# Patient Record
Sex: MALE | Race: OTHER | NOT HISPANIC OR LATINO
[De-identification: names, ages, dates, MRNs, and addresses within clinical notes are randomized per-mention and may not be internally consistent; named-entity substitution may affect disease eponyms.]

---

## 2023-04-15 ENCOUNTER — NON-APPOINTMENT (OUTPATIENT)
Age: 28
End: 2023-04-15

## 2023-04-18 ENCOUNTER — APPOINTMENT (OUTPATIENT)
Dept: ORTHOPEDIC SURGERY | Facility: CLINIC | Age: 28
End: 2023-04-18
Payer: MEDICAID

## 2023-04-18 VITALS — WEIGHT: 172 LBS | HEIGHT: 70 IN | BODY MASS INDEX: 24.62 KG/M2

## 2023-04-18 DIAGNOSIS — S52.501A UNSPECIFIED FRACTURE OF THE LOWER END OF RIGHT RADIUS, INITIAL ENCOUNTER FOR CLOSED FRACTURE: ICD-10-CM

## 2023-04-18 PROBLEM — Z00.00 ENCOUNTER FOR PREVENTIVE HEALTH EXAMINATION: Status: ACTIVE | Noted: 2023-04-18

## 2023-04-18 PROCEDURE — 29075 APPL CST ELBW FNGR SHORT ARM: CPT | Mod: RT

## 2023-04-18 PROCEDURE — 99203 OFFICE O/P NEW LOW 30 MIN: CPT | Mod: 25

## 2023-04-18 RX ORDER — IBUPROFEN 800 MG/1
800 TABLET, FILM COATED ORAL 3 TIMES DAILY
Qty: 42 | Refills: 1 | Status: ACTIVE | COMMUNITY
Start: 2023-04-18 | End: 1900-01-01

## 2023-04-18 NOTE — IMAGING
[de-identified] : Right wrist x-rays taken at [facility] urgent care were reviewed in the office today.  It revealed a nondisplaced mildly impacted distal radius fracture and triquetral avulsion fracture noted.  Otherwise, no other significant abnormalities were seen.

## 2023-04-18 NOTE — HISTORY OF PRESENT ILLNESS
[de-identified] : Patient is a 28-year-old male accompanied by his wife who reports to the office for evaluation of his right wrist pain since 4/14/2023.  He was playing football when he accidentally fell landing on outstretched right hand.  He went to an urgent care where they form x-rays and confirmed that the patient has a distal radius fracture.  Since the injury, range of motion and palpating certain areas of the wrist aggravate the patient's pain.  He is left-hand dominant.  Denies any numbness or tingling.

## 2023-04-18 NOTE — DISCUSSION/SUMMARY
[de-identified] : She was placed in a well fitted and well molded fiberglass short arm cast.  Instructed not to get the cast wet.  Encourage range of motion of fingers and elbow while in cast.  Advised elevation to help with possible swelling\par \par Ibuprofen 800 mg was sent to the patient's pharmacy to help alleviate his symptoms.  The patient will follow-up in 2 weeks for repeat x-rays and further evaluation.  All of the patient's questions/concerns were answered in detail.\par \par The patient was seen under the supervision of Dr. Robbins.

## 2023-04-18 NOTE — PHYSICAL EXAM
[Right] : right hand [Dorsal Wrist] : dorsal wrist [Volar Wrist] : volar wrist [Distal Radius] : distal radius [5___] : pinch 5[unfilled]/5 [] : good capillary refill in all fingers [[email or web address]] : Mild limited ROM of wrist secondary to swelling/pain

## 2023-05-02 ENCOUNTER — APPOINTMENT (OUTPATIENT)
Dept: ORTHOPEDIC SURGERY | Facility: CLINIC | Age: 28
End: 2023-05-02